# Patient Record
Sex: MALE | Race: BLACK OR AFRICAN AMERICAN | NOT HISPANIC OR LATINO | Employment: FULL TIME | URBAN - METROPOLITAN AREA
[De-identification: names, ages, dates, MRNs, and addresses within clinical notes are randomized per-mention and may not be internally consistent; named-entity substitution may affect disease eponyms.]

---

## 2023-04-25 ENCOUNTER — OFFICE VISIT (OUTPATIENT)
Dept: PRIMARY CARE | Facility: CLINIC | Age: 43
End: 2023-04-25
Payer: COMMERCIAL

## 2023-04-25 ENCOUNTER — LAB (OUTPATIENT)
Dept: LAB | Facility: LAB | Age: 43
End: 2023-04-25
Payer: COMMERCIAL

## 2023-04-25 VITALS
TEMPERATURE: 98.1 F | WEIGHT: 229.7 LBS | DIASTOLIC BLOOD PRESSURE: 110 MMHG | BODY MASS INDEX: 32.04 KG/M2 | SYSTOLIC BLOOD PRESSURE: 163 MMHG | HEART RATE: 73 BPM | OXYGEN SATURATION: 94 %

## 2023-04-25 DIAGNOSIS — G62.9 NEUROPATHY: ICD-10-CM

## 2023-04-25 DIAGNOSIS — E11.69 TYPE 2 DIABETES MELLITUS WITH OTHER SPECIFIED COMPLICATION, WITHOUT LONG-TERM CURRENT USE OF INSULIN (MULTI): ICD-10-CM

## 2023-04-25 DIAGNOSIS — Z00.00 HEALTH CARE MAINTENANCE: ICD-10-CM

## 2023-04-25 DIAGNOSIS — I10 HYPERTENSION, UNSPECIFIED TYPE: Primary | ICD-10-CM

## 2023-04-25 LAB
ALANINE AMINOTRANSFERASE (SGPT) (U/L) IN SER/PLAS: 14 U/L (ref 10–52)
ALBUMIN (G/DL) IN SER/PLAS: 4.7 G/DL (ref 3.4–5)
ALKALINE PHOSPHATASE (U/L) IN SER/PLAS: 48 U/L (ref 33–120)
ANION GAP IN SER/PLAS: 12 MMOL/L (ref 10–20)
ASPARTATE AMINOTRANSFERASE (SGOT) (U/L) IN SER/PLAS: 24 U/L (ref 9–39)
BASOPHILS (10*3/UL) IN BLOOD BY AUTOMATED COUNT: 0.02 X10E9/L (ref 0–0.1)
BASOPHILS/100 LEUKOCYTES IN BLOOD BY AUTOMATED COUNT: 0.3 % (ref 0–2)
BILIRUBIN TOTAL (MG/DL) IN SER/PLAS: 0.7 MG/DL (ref 0–1.2)
CALCIUM (MG/DL) IN SER/PLAS: 9.7 MG/DL (ref 8.6–10.6)
CARBON DIOXIDE, TOTAL (MMOL/L) IN SER/PLAS: 28 MMOL/L (ref 21–32)
CHLORIDE (MMOL/L) IN SER/PLAS: 103 MMOL/L (ref 98–107)
COBALAMIN (VITAMIN B12) (PG/ML) IN SER/PLAS: 739 PG/ML (ref 211–911)
CREATININE (MG/DL) IN SER/PLAS: 0.95 MG/DL (ref 0.5–1.3)
EOSINOPHILS (10*3/UL) IN BLOOD BY AUTOMATED COUNT: 0.11 X10E9/L (ref 0–0.7)
EOSINOPHILS/100 LEUKOCYTES IN BLOOD BY AUTOMATED COUNT: 1.5 % (ref 0–6)
ERYTHROCYTE DISTRIBUTION WIDTH (RATIO) BY AUTOMATED COUNT: 12.8 % (ref 11.5–14.5)
ERYTHROCYTE MEAN CORPUSCULAR HEMOGLOBIN CONCENTRATION (G/DL) BY AUTOMATED: 31.9 G/DL (ref 32–36)
ERYTHROCYTE MEAN CORPUSCULAR VOLUME (FL) BY AUTOMATED COUNT: 85 FL (ref 80–100)
ERYTHROCYTES (10*6/UL) IN BLOOD BY AUTOMATED COUNT: 5.34 X10E12/L (ref 4.5–5.9)
ESTIMATED AVERAGE GLUCOSE FOR HBA1C: 128 MG/DL
GFR MALE: >90 ML/MIN/1.73M2
GLUCOSE (MG/DL) IN SER/PLAS: 104 MG/DL (ref 74–99)
HEMATOCRIT (%) IN BLOOD BY AUTOMATED COUNT: 45.2 % (ref 41–52)
HEMOGLOBIN (G/DL) IN BLOOD: 14.4 G/DL (ref 13.5–17.5)
HEMOGLOBIN A1C/HEMOGLOBIN TOTAL IN BLOOD: 6.1 %
IMMATURE GRANULOCYTES/100 LEUKOCYTES IN BLOOD BY AUTOMATED COUNT: 0.3 % (ref 0–0.9)
LEUKOCYTES (10*3/UL) IN BLOOD BY AUTOMATED COUNT: 7.3 X10E9/L (ref 4.4–11.3)
LYMPHOCYTES (10*3/UL) IN BLOOD BY AUTOMATED COUNT: 2.71 X10E9/L (ref 1.2–4.8)
LYMPHOCYTES/100 LEUKOCYTES IN BLOOD BY AUTOMATED COUNT: 37 % (ref 13–44)
MONOCYTES (10*3/UL) IN BLOOD BY AUTOMATED COUNT: 0.66 X10E9/L (ref 0.1–1)
MONOCYTES/100 LEUKOCYTES IN BLOOD BY AUTOMATED COUNT: 9 % (ref 2–10)
NEUTROPHILS (10*3/UL) IN BLOOD BY AUTOMATED COUNT: 3.81 X10E9/L (ref 1.2–7.7)
NEUTROPHILS/100 LEUKOCYTES IN BLOOD BY AUTOMATED COUNT: 51.9 % (ref 40–80)
NRBC (PER 100 WBCS) BY AUTOMATED COUNT: 0 /100 WBC (ref 0–0)
PLATELETS (10*3/UL) IN BLOOD AUTOMATED COUNT: 231 X10E9/L (ref 150–450)
POTASSIUM (MMOL/L) IN SER/PLAS: 4.2 MMOL/L (ref 3.5–5.3)
PROTEIN TOTAL: 7.5 G/DL (ref 6.4–8.2)
SODIUM (MMOL/L) IN SER/PLAS: 139 MMOL/L (ref 136–145)
UREA NITROGEN (MG/DL) IN SER/PLAS: 13 MG/DL (ref 6–23)

## 2023-04-25 PROCEDURE — 1036F TOBACCO NON-USER: CPT | Performed by: STUDENT IN AN ORGANIZED HEALTH CARE EDUCATION/TRAINING PROGRAM

## 2023-04-25 PROCEDURE — 84153 ASSAY OF PSA TOTAL: CPT

## 2023-04-25 PROCEDURE — 85025 COMPLETE CBC W/AUTO DIFF WBC: CPT

## 2023-04-25 PROCEDURE — 3077F SYST BP >= 140 MM HG: CPT | Performed by: STUDENT IN AN ORGANIZED HEALTH CARE EDUCATION/TRAINING PROGRAM

## 2023-04-25 PROCEDURE — 84154 ASSAY OF PSA FREE: CPT

## 2023-04-25 PROCEDURE — 84207 ASSAY OF VITAMIN B-6: CPT

## 2023-04-25 PROCEDURE — 3080F DIAST BP >= 90 MM HG: CPT | Performed by: STUDENT IN AN ORGANIZED HEALTH CARE EDUCATION/TRAINING PROGRAM

## 2023-04-25 PROCEDURE — 82607 VITAMIN B-12: CPT

## 2023-04-25 PROCEDURE — 3008F BODY MASS INDEX DOCD: CPT | Performed by: STUDENT IN AN ORGANIZED HEALTH CARE EDUCATION/TRAINING PROGRAM

## 2023-04-25 PROCEDURE — 36415 COLL VENOUS BLD VENIPUNCTURE: CPT

## 2023-04-25 PROCEDURE — 80053 COMPREHEN METABOLIC PANEL: CPT

## 2023-04-25 PROCEDURE — 99214 OFFICE O/P EST MOD 30 MIN: CPT | Performed by: STUDENT IN AN ORGANIZED HEALTH CARE EDUCATION/TRAINING PROGRAM

## 2023-04-25 PROCEDURE — 83036 HEMOGLOBIN GLYCOSYLATED A1C: CPT

## 2023-04-25 ASSESSMENT — PAIN SCALES - GENERAL: PAINLEVEL: 0-NO PAIN

## 2023-04-25 NOTE — PROGRESS NOTES
Subjective   Patient ID: Eduin Sparks is a 43 y.o. male who presents for Establish Care.    HPI       #HTN  -Diagnosed in past, noncompliant with medication in the past, pt is not any medications for these, is electing to still refuse medication, wanting to opt for diet change, denies current chest pain, vision changes or headache  Lab Results   Component Value Date    CREATININE 0.90 02/01/2022    BUN 13 02/01/2022     02/01/2022    K 4.2 02/01/2022    CL 99 02/01/2022    CO2 27 02/01/2022       #DM2  #HLD  -Diagnosed in the past, previous documented history of noncompliance with medication  -Again refusing meds, knows the risks of MI or stroke and would like to opt for diet changes  -Ok with repeat blood work    #HM  -Never had a colonoscopy before, interested, also requesting PSA despite not having a FH of prostate cancer or symptoms of unintentional weight loss or night sweats  -Has had Hep C and HIV negative in the past  -TDAP UTD     #Neuropathy  -Right 5th digit, started 2-3 weeks ago, on and off numbness and tingling, has a history of herniated disc in back many years ago, denies injury, does not interfere with work, denies weakness  Lab Results   Component Value Date    HGBA1C 6.6 (A) 02/01/2022      Review of Systems    Denies current chest pain, SOB, headache, vision changes     Objective   BP (!) 163/110   Pulse 73   Temp 36.7 °C (98.1 °F) (Tympanic)   Wt 104 kg (229 lb 11.2 oz)   SpO2 94%   BMI 32.04 kg/m²     Physical Exam    Constitutional: Well developed, awake, alert, oriented x3  Head and Face: NCAT  Eyes: Normal external exam, clear sclera bl  ENT: Normal external inspection of ears and nose. Oropharynx normal.  Cardiovascular: RRR, S1/S2, no murmurs, rubs, or gallops, radial pulses +2, no edema of extremities  Pulmonary: CTAB, no respiratory distress.  Abdomen: +BS, soft, non-tender, nondistended, no guarding or rebound, no masses noted  Neuro: A&O x3, CN II-XII grossly intact, no focal  neuro deficits   MSK: No joint swelling, normal movements of all extremities. Range of motion- normal. Sensation to light touch intact in R hand, full ROM, full strength, Phalen and Tinnell negative, no visible swelling or erythema   Skin- No lesions, contusions, or erythema.  Psychiatric: Judgment intact. Appropriate mood and behavior     Assessment/Plan   Diagnoses and all orders for this visit:  Hypertension, unspecified type  -Significantly elevated, again discussed importance of control with patient, still refusing medication despite significant cardiovascular risk, repeat CMP, pt agreeable to this  Type 2 diabetes mellitus with other specified complication, without long-term current use of insulin (CMS/[facility])  -Reviewed significant risks of not taking statin medication and significant MI/stroke risk, pt still refusing  -     Hemoglobin A1C; Future  -Referred to Podiatry and Opt   Health care maintenance  -     Colonoscopy; Future  -     PSA, total and free; Future  Neuropathy  -Could be related to DM , vs underlying cause, no acute findings on PE, lab work and OT referral  -     Referral to Occupational Therapy; Future  -     Comprehensive Metabolic Panel; Future  -     CBC and Auto Differential; Future  -     Vitamin B12; Future  -     Vitamin B6; Future    Discussed and seen with Dr. Robb  Return in : 1 month for FUV    Portions of this note were generated using digital voice recognition software, and may contain grammatical errors       Nicola Manuel MD  PGY-3, Family Medicine

## 2023-04-25 NOTE — PROGRESS NOTES
I saw and evaluated the patient. I personally obtained the key and critical portions of the history and physical exam or was physically present for key and critical portions performed by the resident/fellow. I reviewed the resident/fellow's documentation and discussed the patient with the resident/fellow. I agree with the resident/fellow's medical decision making as documented in the note with the exception/addition of the following:  Here with DM and HTN and declining to start any meds (don't like pills), counseled regarding lifestyle changes and advised may not be enough.  Interested in age appropriate screenings.  Matt Robb MD

## 2023-04-28 LAB
PROSTATE SPECIFIC AG (NG/ML) IN SER/PLAS: 0.6 NG/ML (ref 0–4)
PROSTATE SPECIFIC AG FREE (NG/ML) IN SER/PLAS: 0.2 NG/ML
PROSTATE SPECIFIC AG FREE/PROSTATE SPECIFIC AG TOTAL IN SER/PLAS: 33 %

## 2023-05-01 LAB — VITAMIN B6: 81.6 NMOL/L (ref 20–125)

## 2023-05-23 ENCOUNTER — OFFICE VISIT (OUTPATIENT)
Dept: PRIMARY CARE | Facility: CLINIC | Age: 43
End: 2023-05-23
Payer: COMMERCIAL

## 2023-05-23 VITALS
DIASTOLIC BLOOD PRESSURE: 92 MMHG | OXYGEN SATURATION: 98 % | HEART RATE: 74 BPM | SYSTOLIC BLOOD PRESSURE: 139 MMHG | HEIGHT: 71 IN | TEMPERATURE: 98 F | WEIGHT: 225.9 LBS | BODY MASS INDEX: 31.63 KG/M2

## 2023-05-23 DIAGNOSIS — I10 HYPERTENSION, UNSPECIFIED TYPE: Primary | ICD-10-CM

## 2023-05-23 DIAGNOSIS — E78.5 HYPERLIPIDEMIA, UNSPECIFIED HYPERLIPIDEMIA TYPE: ICD-10-CM

## 2023-05-23 DIAGNOSIS — E11.69 TYPE 2 DIABETES MELLITUS WITH OTHER SPECIFIED COMPLICATION, WITHOUT LONG-TERM CURRENT USE OF INSULIN (MULTI): ICD-10-CM

## 2023-05-23 DIAGNOSIS — G62.9 NEUROPATHY: ICD-10-CM

## 2023-05-23 PROCEDURE — 1036F TOBACCO NON-USER: CPT | Performed by: STUDENT IN AN ORGANIZED HEALTH CARE EDUCATION/TRAINING PROGRAM

## 2023-05-23 PROCEDURE — 3080F DIAST BP >= 90 MM HG: CPT | Performed by: STUDENT IN AN ORGANIZED HEALTH CARE EDUCATION/TRAINING PROGRAM

## 2023-05-23 PROCEDURE — 3008F BODY MASS INDEX DOCD: CPT | Performed by: STUDENT IN AN ORGANIZED HEALTH CARE EDUCATION/TRAINING PROGRAM

## 2023-05-23 PROCEDURE — 3075F SYST BP GE 130 - 139MM HG: CPT | Performed by: STUDENT IN AN ORGANIZED HEALTH CARE EDUCATION/TRAINING PROGRAM

## 2023-05-23 PROCEDURE — 3044F HG A1C LEVEL LT 7.0%: CPT | Performed by: STUDENT IN AN ORGANIZED HEALTH CARE EDUCATION/TRAINING PROGRAM

## 2023-05-23 PROCEDURE — 99214 OFFICE O/P EST MOD 30 MIN: CPT | Performed by: STUDENT IN AN ORGANIZED HEALTH CARE EDUCATION/TRAINING PROGRAM

## 2023-05-23 RX ORDER — ALBUTEROL SULFATE 90 UG/1
2 AEROSOL, METERED RESPIRATORY (INHALATION) EVERY 4 HOURS PRN
COMMUNITY
Start: 2018-10-06 | End: 2023-06-19 | Stop reason: SDUPTHER

## 2023-05-23 RX ORDER — NEBULIZER AND COMPRESSOR
1 EACH MISCELLANEOUS DAILY
Qty: 1 EACH | Refills: 0 | Status: SHIPPED | OUTPATIENT
Start: 2023-05-23

## 2023-05-23 NOTE — PROGRESS NOTES
I saw and evaluated the patient. I personally obtained the key and critical portions of the history and physical exam or was physically present for key and critical portions performed by the resident/fellow. I reviewed the resident/fellow's documentation and discussed the patient with the resident/fellow. I agree with the resident/fellow's medical decision making as documented in the note.    Matt Robb MD

## 2023-05-23 NOTE — PROGRESS NOTES
"Subjective   Patient ID: Eduin Sparks is a 43 y.o. male who presents for Follow-up.    HPI     #Neuropathy  -See last note for full details, Lab workup largely unremarkable, referred to OT, has been going and this has helped, symptoms have improved   -Has a history of a break in his proximal ulna many years ago in longterm, had gotten a sling, never saw Ortho, OT suspecting some nerve entrapment  -Interested in Ortho referral, fracture was in 2004     #HTN  -See last note for full details, had been non-compliant with all meds, refused at last visit, wanted to manage with diet alone, BP improved today  -Does extensive exercise including Cardio and weight lifting, has reduced salt and fried food intake  -Does not check BP at home, needs cuff     #DM2  -Non-compliant as above, A1c well controlled with diet alone, had refused statin in the past, still refusing today, would like to continue dietary changes   -Dietary changes as above  Lab Results   Component Value Date    HGBA1C 6.1 (A) 04/25/2023          Review of Systems    Pt denies any current chest pain, SOB, nausea, vomiting     Objective   BP (!) 139/92 (BP Location: Left arm, Patient Position: Sitting)   Pulse 74   Temp 36.7 °C (98 °F) (Temporal)   Ht 1.803 m (5' 11\")   Wt 102 kg (225 lb 14.4 oz)   SpO2 98%   BMI 31.51 kg/m²     Physical Exam    Constitutional: Well developed, awake, alert, oriented x3  Head and Face: NCAT  Eyes: Normal external exam, clear sclera bl  ENT: Normal external inspection of ears and nose. Oropharynx normal.  Cardiovascular: RRR, S1/S2, no murmurs, rubs, or gallops, radial pulses +2, no edema of extremities  Pulmonary: CTAB, no respiratory distress.  Abdomen: +BS, soft, non-tender, nondistended, no guarding or rebound, no masses noted  Neuro: A&O x3, CN II-XII grossly intact, no focal neuro deficits   MSK: No joint swelling, normal movements of all extremities. Range of motion- normal. Appropriate  strength in RUE, has some " limited ROM on R elbow extension, sensation to light touch intact, negative Phalen or Tinnels sign  Skin- No lesions, contusions, or erythema.  Psychiatric: Judgment intact. Appropriate mood and behavior     Assessment/Plan   Diagnoses and all orders for this visit:  Hypertension, unspecified type  -     miscellaneous medical supply (Blood Pressure Cuff) misc; 1 kit once daily.  -Pt still opting for diet control only, reviewed 4 pillars of Fitter Me with patient and Dash diet, recommended again low diet in fatty and fried foods and weight loss  Type 2 diabetes mellitus with other specified complication, without long-term current use of insulin (CMS/[facility])  -Pt again opting for dietary changes only, as above  Neuropathy  -     Referral to Orthopaedic Surgery; Future  -Appears improved ,continue OT, refer to Ortho for potential surgical intervention  Hyperlipidemia, unspecified hyperlipidemia type  - Refusing statin again, dietary changes as above    Discussed and seen with Dr. oRbb  Return in : 1-2 months for  visit        Portions of this note were generated using digital voice recognition software, and may contain grammatical errors       Nicola Manuel MD  PGY-3, Family Medicine

## 2023-06-16 ENCOUNTER — APPOINTMENT (OUTPATIENT)
Dept: PRIMARY CARE | Facility: CLINIC | Age: 43
End: 2023-06-16
Payer: COMMERCIAL

## 2023-06-19 ENCOUNTER — TELEMEDICINE (OUTPATIENT)
Dept: PRIMARY CARE | Facility: CLINIC | Age: 43
End: 2023-06-19
Payer: COMMERCIAL

## 2023-06-19 VITALS — BODY MASS INDEX: 31.51 KG/M2 | HEIGHT: 71 IN

## 2023-06-19 DIAGNOSIS — Z00.00 HEALTH CARE MAINTENANCE: ICD-10-CM

## 2023-06-19 DIAGNOSIS — I10 PRIMARY HYPERTENSION: Primary | ICD-10-CM

## 2023-06-19 PROBLEM — M19.90 ARTHRITIS: Status: ACTIVE | Noted: 2023-06-19

## 2023-06-19 PROBLEM — E11.9 TYPE 2 DIABETES MELLITUS WITHOUT COMPLICATION, WITHOUT LONG-TERM CURRENT USE OF INSULIN (MULTI): Status: ACTIVE | Noted: 2023-06-19

## 2023-06-19 PROBLEM — S52.121A CLOSED DISPLACED FRACTURE OF HEAD OF RIGHT RADIUS: Status: ACTIVE | Noted: 2021-07-26

## 2023-06-19 PROBLEM — F17.200 NICOTINE DEPENDENCE: Status: ACTIVE | Noted: 2023-06-19

## 2023-06-19 PROBLEM — R73.09 ELEVATED HEMOGLOBIN A1C: Status: ACTIVE | Noted: 2023-06-19

## 2023-06-19 PROCEDURE — 99212 OFFICE O/P EST SF 10 MIN: CPT | Performed by: FAMILY MEDICINE

## 2023-06-19 RX ORDER — ATORVASTATIN CALCIUM 40 MG/1
1 TABLET, FILM COATED ORAL DAILY
COMMUNITY
Start: 2022-04-08 | End: 2024-05-29

## 2023-06-19 RX ORDER — NEBULIZER AND COMPRESSOR
1 EACH MISCELLANEOUS DAILY
Qty: 1 EACH | Refills: 0 | Status: SHIPPED | OUTPATIENT
Start: 2023-06-19

## 2023-06-19 RX ORDER — CHLORTHALIDONE 25 MG/1
1 TABLET ORAL DAILY
COMMUNITY
Start: 2018-09-10 | End: 2024-05-29 | Stop reason: SDUPTHER

## 2023-06-19 RX ORDER — ALBUTEROL SULFATE 90 UG/1
2 AEROSOL, METERED RESPIRATORY (INHALATION) EVERY 4 HOURS PRN
Qty: 18 G | Refills: 2 | Status: SHIPPED | OUTPATIENT
Start: 2023-06-19

## 2023-06-19 RX ORDER — ASPIRIN 81 MG/1
81 TABLET ORAL 2 TIMES DAILY
COMMUNITY
Start: 2021-04-15 | End: 2024-05-29 | Stop reason: SDDI

## 2023-06-19 RX ORDER — METFORMIN HYDROCHLORIDE 500 MG/1
TABLET, EXTENDED RELEASE ORAL EVERY 12 HOURS
COMMUNITY
Start: 2022-04-08 | End: 2024-05-29

## 2023-06-19 RX ORDER — DEXTROMETHORPHAN POLISTIREX 30 MG/5ML
60 SUSPENSION ORAL 2 TIMES DAILY
COMMUNITY
Start: 2018-10-06

## 2023-06-19 NOTE — PROGRESS NOTES
HPI:  History was provided by patient,since last seen for Hypertension,patient denies chest pain,Palpitation,Headache and blurry vision.  But complains of - occasional SOB for which he has been using an inhaler Prn. Medications reviewed with patient and patient reports compliance with meds.  Has been working on lifestyle modifications to control DM and HTN , declines to use medications.      Objective   PHYSICAL EXAMINATION:  GENERAL: Alert,In no apparent distress  HEENT: Normocephalic, atraumatic. Extraocular movements intact.  NECK: Supple.  CHEST: Non labored breathing  EXTREMITIES: NROM  NEUROLOGIC: Motor Functions Grossly intact      Lab Review   Lab Results   Component Value Date     04/25/2023    K 4.2 04/25/2023     04/25/2023    CO2 28 04/25/2023    BUN 13 04/25/2023    CREATININE 0.95 04/25/2023    GLUCOSE 104 (H) 04/25/2023    CALCIUM 9.7 04/25/2023     Lab Results   Component Value Date    CHOL 246 (H) 02/01/2022    TRIG 120 02/01/2022    HDL 44.9 02/01/2022       Assessment/Plan   Diagnoses and all orders for this visit:  Primary hypertension  -     miscellaneous medical supply (Blood Pressure Cuff) misc; 1 Units once daily. Cuff to check BP daily  Health care maintenance  -     albuterol 90 mcg/actuation inhaler; Inhale 2 puffs every 4 hours if needed for shortness of breath.    Diagnosis and Management discussed with the patient.  Patient agreeable with plan.  Patient advised to Return to clinic with new or unresolved symptoms.  Matt Robb MD

## 2024-05-29 ENCOUNTER — OFFICE VISIT (OUTPATIENT)
Dept: PRIMARY CARE | Facility: CLINIC | Age: 44
End: 2024-05-29
Payer: COMMERCIAL

## 2024-05-29 ENCOUNTER — LAB (OUTPATIENT)
Dept: LAB | Facility: LAB | Age: 44
End: 2024-05-29
Payer: COMMERCIAL

## 2024-05-29 VITALS
DIASTOLIC BLOOD PRESSURE: 93 MMHG | BODY MASS INDEX: 31.08 KG/M2 | SYSTOLIC BLOOD PRESSURE: 158 MMHG | TEMPERATURE: 97.6 F | WEIGHT: 222 LBS | HEART RATE: 70 BPM | HEIGHT: 71 IN | OXYGEN SATURATION: 99 %

## 2024-05-29 DIAGNOSIS — E11.9 TYPE 2 DIABETES MELLITUS WITHOUT COMPLICATION, WITHOUT LONG-TERM CURRENT USE OF INSULIN (MULTI): ICD-10-CM

## 2024-05-29 DIAGNOSIS — I10 PRIMARY HYPERTENSION: ICD-10-CM

## 2024-05-29 DIAGNOSIS — I10 PRIMARY HYPERTENSION: Primary | ICD-10-CM

## 2024-05-29 LAB
ALBUMIN SERPL BCP-MCNC: 4.7 G/DL (ref 3.4–5)
ALP SERPL-CCNC: 46 U/L (ref 33–120)
ALT SERPL W P-5'-P-CCNC: 12 U/L (ref 10–52)
ANION GAP SERPL CALC-SCNC: 12 MMOL/L (ref 10–20)
AST SERPL W P-5'-P-CCNC: 19 U/L (ref 9–39)
BILIRUB SERPL-MCNC: 0.7 MG/DL (ref 0–1.2)
BUN SERPL-MCNC: 12 MG/DL (ref 6–23)
CALCIUM SERPL-MCNC: 9.6 MG/DL (ref 8.6–10.6)
CHLORIDE SERPL-SCNC: 100 MMOL/L (ref 98–107)
CHOLEST SERPL-MCNC: 230 MG/DL (ref 0–199)
CHOLESTEROL/HDL RATIO: 5.3
CO2 SERPL-SCNC: 29 MMOL/L (ref 21–32)
CREAT SERPL-MCNC: 0.98 MG/DL (ref 0.5–1.3)
CREAT UR-MCNC: 240 MG/DL (ref 20–370)
EGFRCR SERPLBLD CKD-EPI 2021: >90 ML/MIN/1.73M*2
EST. AVERAGE GLUCOSE BLD GHB EST-MCNC: 131 MG/DL
GLUCOSE SERPL-MCNC: 91 MG/DL (ref 74–99)
HBA1C MFR BLD: 6.2 %
HDLC SERPL-MCNC: 43.6 MG/DL
MICROALBUMIN UR-MCNC: 28.6 MG/L
MICROALBUMIN/CREAT UR: 11.9 UG/MG CREAT
NON-HDL CHOLESTEROL: 186 MG/DL (ref 0–149)
POTASSIUM SERPL-SCNC: 4.3 MMOL/L (ref 3.5–5.3)
PROT SERPL-MCNC: 7.8 G/DL (ref 6.4–8.2)
SODIUM SERPL-SCNC: 137 MMOL/L (ref 136–145)

## 2024-05-29 PROCEDURE — 82043 UR ALBUMIN QUANTITATIVE: CPT

## 2024-05-29 PROCEDURE — 80053 COMPREHEN METABOLIC PANEL: CPT

## 2024-05-29 PROCEDURE — 82465 ASSAY BLD/SERUM CHOLESTEROL: CPT

## 2024-05-29 PROCEDURE — 83718 ASSAY OF LIPOPROTEIN: CPT

## 2024-05-29 PROCEDURE — 83036 HEMOGLOBIN GLYCOSYLATED A1C: CPT

## 2024-05-29 PROCEDURE — 82570 ASSAY OF URINE CREATININE: CPT

## 2024-05-29 RX ORDER — CHLORTHALIDONE 25 MG/1
25 TABLET ORAL DAILY
Qty: 90 TABLET | Refills: 3 | Status: SHIPPED | OUTPATIENT
Start: 2024-05-29

## 2024-05-29 ASSESSMENT — PAIN SCALES - GENERAL: PAINLEVEL: 0-NO PAIN

## 2024-05-29 NOTE — PROGRESS NOTES
Agree with Resident and/or Medical student plan to start Thiazide diuretic for HTN disorder likely 2/2 to med noncompliance and encourage patient remain compliant with medication    Patient discussed with attending, Dr. Fabiola Messina MD  Family Medicine, PGY-3

## 2024-05-31 NOTE — PROGRESS NOTES
"Subjective   Patient ID: Eduin Sparks is a 44 y.o. male with a PMH of HTN, D2M, HLD, and neuropathy who presents for Annual Exam.    HPI     Patient states he has been having stabbing temporal headaches almost everyday for the past 2 months. He states they usually last about two minutes and are an 8/10 on the pain scale. He doesn't notice anything thing in particular that triggers them and states they do not have a pattern regarding time of day. He states nothing particularly worsens it and has not tried anything to make it better.     He also acknowledges that his blood pressure remains elevated despite implementing a diet and exercise routine. He states he cut out salt, eats a healthy varied diet, and exercises at least three times a week. He has declined medication at previous visits claiming he does not like to take pills. He states his 25 yo son passed away after taking \"a bad pill\" and now does not take any medications except for OTC allergy medication PRN. He also states he was hesitant to take medication due to possible side effects, including erectile dysfunction.    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 He has no other acute complaints at this time.     Review of Systems  Negative except mentioned above in HPI    Objective   BP (!) 158/93   Pulse 70   Temp 36.4 °C (97.6 °F) (Temporal)   Ht 1.803 m (5' 11\")   Wt 101 kg (222 lb)   SpO2 99%   BMI 30.96 kg/m²     Physical Exam  Constitutional:       Appearance: Normal appearance.   HENT:      Head: Normocephalic and atraumatic.   Cardiovascular:      Rate and Rhythm: Normal rate and regular rhythm.   Pulmonary:      Effort: Pulmonary effort is normal.      Breath sounds: Normal breath sounds.   Skin:     General: Skin is warm and dry.   Neurological:      General: No focal deficit present.   Psychiatric:         Mood and Affect: Mood normal.       "   Behavior: Behavior normal.       Assessment/Plan   Diagnoses and all orders for this visit:  Primary hypertension  -     Albumin, urine, random; Future  -     Lipid Panel Non-Fasting; Future  -     Comprehensive metabolic panel; Future  -     chlorthalidone (Hygroton) 25 mg tablet; Take 1 tablet (25 mg) by mouth once daily.  Type 2 diabetes mellitus without complication, without long-term current use of insulin (Multi)  -     Hemoglobin A1c; Future  Other orders  -     Pneumococcal conjugate vaccine, 20-valent (PREVNAR 20)  -     Hepatitis B vaccine, 20 yrs and older (RECOMBIVAX, ENGERIX)    Eduin Sparks is a 44 y.o. male with a PMH of HTN, D2M, HLD, and neuropathy who presents for Annual Exam. The importance of managing blood pressure was discussed extensively with the patient. Patient is motivated to improve his blood pressure and maintain his overall health. Patient was counseled on the risk/benefits of continuing non-pharmacologic BP lowering regimen vs. implementing chlorthialidone. Patient was also counseled that lowering his BP may improve his headaches. Patient agreed to start chlorthalidone 25mg and continue his exercise/diet regimen. Detailed return precautions regarding headaches provided.     Patient was also given Prevnar-20 and first dose of HepB vaccine in office today.     RTC in 4-6 weeks for BP fu or earlier as needed.    I saw and evaluated the patient. I personally obtained the key and critical portions of the history and physical exam or was physically present for key and critical portions performed by the student. I reviewed the student's documentation and discussed the patient with the student. I agree with the studen'ts medical decision making as documented in the note.    43 yo M with HTN and failed non-medication management presenting for annual visit. Pt complaining of intermittent headaches, believed to be secondary to uncontrolled high blood pressure. Pt denied red flag symptoms related  to his headaches. Pt amenable to beginning medication today. FU in 4-6 weeks for BP follow up or earlier as needed.     Duncan Alex, DO  PGY-1 Family Medicine

## 2024-06-12 NOTE — PROGRESS NOTES
I saw and evaluated the patient. I personally obtained the key and critical portions of the history and physical exam or was physically present for key and critical portions performed by the resident/fellow. I reviewed the resident/fellow's documentation and discussed the patient with the resident/fellow. I agree with the resident/fellow's medical decision making as documented in the note.    Elli Yip MD

## 2025-03-04 ENCOUNTER — OFFICE VISIT (OUTPATIENT)
Facility: HOSPITAL | Age: 45
End: 2025-03-04
Payer: COMMERCIAL

## 2025-03-04 VITALS
OXYGEN SATURATION: 97 % | BODY MASS INDEX: 31.3 KG/M2 | HEART RATE: 96 BPM | TEMPERATURE: 98.2 F | RESPIRATION RATE: 16 BRPM | SYSTOLIC BLOOD PRESSURE: 154 MMHG | WEIGHT: 223.6 LBS | HEIGHT: 71 IN | DIASTOLIC BLOOD PRESSURE: 82 MMHG

## 2025-03-04 DIAGNOSIS — Z12.11 ENCOUNTER FOR SCREENING FOR MALIGNANT NEOPLASM OF COLON: ICD-10-CM

## 2025-03-04 DIAGNOSIS — E11.9 TYPE 2 DIABETES MELLITUS WITHOUT COMPLICATION, WITHOUT LONG-TERM CURRENT USE OF INSULIN (MULTI): ICD-10-CM

## 2025-03-04 DIAGNOSIS — I10 PRIMARY HYPERTENSION: ICD-10-CM

## 2025-03-04 DIAGNOSIS — G89.29 CHRONIC BILATERAL LOW BACK PAIN WITHOUT SCIATICA: ICD-10-CM

## 2025-03-04 DIAGNOSIS — Z00.00 ANNUAL PHYSICAL EXAM: Primary | ICD-10-CM

## 2025-03-04 DIAGNOSIS — M54.50 CHRONIC BILATERAL LOW BACK PAIN WITHOUT SCIATICA: ICD-10-CM

## 2025-03-04 DIAGNOSIS — Z00.00 HEALTH CARE MAINTENANCE: ICD-10-CM

## 2025-03-04 DIAGNOSIS — Z11.3 SCREEN FOR STD (SEXUALLY TRANSMITTED DISEASE): ICD-10-CM

## 2025-03-04 DIAGNOSIS — E78.2 MIXED HYPERLIPIDEMIA: ICD-10-CM

## 2025-03-04 DIAGNOSIS — R06.02 SOB (SHORTNESS OF BREATH): ICD-10-CM

## 2025-03-04 RX ORDER — ATORVASTATIN CALCIUM 20 MG/1
20 TABLET, FILM COATED ORAL DAILY
Qty: 100 TABLET | Refills: 3 | Status: SHIPPED | OUTPATIENT
Start: 2025-03-04 | End: 2026-04-08

## 2025-03-04 RX ORDER — ALBUTEROL SULFATE 90 UG/1
2 INHALANT RESPIRATORY (INHALATION) EVERY 4 HOURS PRN
Qty: 18 G | Refills: 2 | Status: SHIPPED | OUTPATIENT
Start: 2025-03-04

## 2025-03-04 RX ORDER — CHLORTHALIDONE 25 MG/1
25 TABLET ORAL DAILY
Qty: 90 TABLET | Refills: 3 | Status: SHIPPED | OUTPATIENT
Start: 2025-03-04

## 2025-03-04 ASSESSMENT — ENCOUNTER SYMPTOMS
LOSS OF SENSATION IN FEET: 0
DEPRESSION: 0
OCCASIONAL FEELINGS OF UNSTEADINESS: 0

## 2025-03-04 ASSESSMENT — PAIN SCALES - GENERAL: PAINLEVEL_OUTOF10: 6

## 2025-03-04 NOTE — PROGRESS NOTES
"Subjective   Patient ID: Eduin Sparks is a 45 y.o. male who presents for Physical.    HPI   Patient present for annual physical exam     Lower back pain   - chronic issues  - recent excarbation secondary to lifting heavy barrel at work   - denies aany bowel or urinary incontinence   - has been taking occasional ibuprofen     HTN  - patient endorse compliance with chlorthalidone  - patient reports that he does check his blood pressure at home but does not remember what they were    - denies CP, SOB, headache, vision changes, palpitations, leg swelling     SOB  - has not been experiencing any lately  - requesting refill of albuterol inhaler   - reports that he does not smoke tobacco products but does smoke MJ daily     T2DM  - endorse compliance with dietary changes   - has not followed up with an eye doctor in awhile   - checks feet often and denies any concerns for wounds or lesions     PHQ2: negative     Declines any vaccines     Patient interested in colonoscopy at main campus     Review of Systems  ROS negative unless noted in HPI  Objective   BP (!) 146/96 (BP Location: Right arm, Patient Position: Sitting, BP Cuff Size: Adult)   Pulse 96   Temp 36.8 °C (98.2 °F) (Temporal)   Resp 16   Ht 1.803 m (5' 11\")   Wt 101 kg (223 lb 9.6 oz)   SpO2 97%   BMI 31.19 kg/m²     Physical Exam  Vitals reviewed.   Constitutional:       General: He is not in acute distress.     Appearance: Normal appearance. He is obese.   HENT:      Right Ear: External ear normal.      Left Ear: External ear normal.      Nose: Nose normal.   Cardiovascular:      Rate and Rhythm: Normal rate and regular rhythm.      Pulses: Normal pulses.      Heart sounds: Normal heart sounds. No murmur heard.     No friction rub. No gallop.   Pulmonary:      Effort: Pulmonary effort is normal. No respiratory distress.      Breath sounds: Normal breath sounds.   Abdominal:      General: Bowel sounds are normal. There is no distension.      Palpations: " Abdomen is soft.      Tenderness: There is no abdominal tenderness. There is no guarding.   Musculoskeletal:         General: Normal range of motion.      Cervical back: Normal, normal range of motion and neck supple.      Thoracic back: Normal.      Lumbar back: Tenderness present. No bony tenderness. Normal range of motion.      Right lower leg: No edema.      Left lower leg: No edema.      Comments: + tenderness on palpitation of paraspinal muscles    Negative straight leg test bilaterally    Skin:     General: Skin is warm and dry.      Capillary Refill: Capillary refill takes less than 2 seconds.   Neurological:      General: No focal deficit present.      Mental Status: He is alert and oriented to person, place, and time.      Cranial Nerves: No cranial nerve deficit.      Sensory: No sensory deficit.      Motor: No weakness.   Psychiatric:         Mood and Affect: Mood normal.         Behavior: Behavior normal.         Assessment/Plan   Eduin Sparks is 46 y/o M w/ HTN, T2DM (A1C 6.2% 5/29/24), HLD, and obesity (BMI: 31.19) who presented for annual physical exam.  Plan below:   \  Diagnoses and all orders for this visit:  Primary hypertension  - IO BP: 146/96 on manual repeat 154/82, not at goal of <130/80  - patient asx   - discussed recommendation of adding additional antihypertensive agent, which patient was not amenable  - Provided information of DASH diet and recommend trial for life style modifications   - c/w current regiment of chlorthalidone 25mg every day   - encouraged patient to monitor ambulatory blood pressure with blood pressure log provided   - patient would benefit from having own blood pressure monitor at home for ambulatory blood pressure monitoring   -     chlorthalidone (Hygroton) 25 mg tablet; Take 1 tablet (25 mg) by mouth once daily.  -     Comprehensive Metabolic Panel; Future  -     Albumin-Creatinine Ratio, Urine Random; Future  -     miscellaneous medical supply (Blood Pressure Cuff)  misc; 1 Units once daily. Cuff to check BP daily  Health care maintenance  -     Vitamin D 25-Hydroxy,Total (for eval of Vitamin D levels); Future  SOB (shortness of breath)  - recommend cessation of daily smoking of MJ for symptoms of SOB and HTN  -     albuterol 90 mcg/actuation inhaler; Inhale 2 puffs every 4 hours if needed for shortness of breath.  Mixed hyperlipidemia  - shared decision making discussion with patient about starting statin therapy for elevated ASCVD risk and history of uncontrolled HTN and T2DM  - patient amenable to trial of atorvastatin 20mg every day, with plan of up-titrating at follow up appointment to high intensity therapy   -     atorvastatin (Lipitor) 20 mg tablet; Take 1 tablet (20 mg) by mouth once daily.  Type 2 diabetes mellitus without complication, without long-term current use of insulin (Multi)  - Recommend continued life style modifications for management   - Plan on discussing initiation of antidiabetic medication pending repeat A1C at follow up appointment   -     Albumin-Creatinine Ratio, Urine Random; Future  -     Hemoglobin A1c; Future  -     Referral to Ophthalmology; Future  Screen for STD (sexually transmitted disease)  -     C. trachomatis / N. gonorrhoeae, Amplified, Urogenital; Future  -     HIV 1/2 Antigen/Antibody Screen with Reflex to Confirmation; Future  -     Syphilis Screen with Reflex; Future  -     Trichomonas vaginalis, Amplified; Future  Encounter for screening for malignant neoplasm of colon  -     Colonoscopy Screening; Average Risk Patient; Future  Chronic bilateral low back pain without sciatica  - chronic issue, with recent exacerbation secondary to lifting heavy object at work   - negative straight leg test bilaterally   - recommend RICE and regular stretching, provide handout on stretches for chronic back pain   Other orders  -     Follow Up In Primary Care - Established; Future       # Routine Health Maintenance  - Flu vaccine: recommended  annually, patient declined   - Prevnar 20: recommended but declined   - Tdap: due 2/1/2032 or sooner if indicated   - HPV (<45): not indicated   - Shingrix(50+): not indicated  - Hep C: negative 2/1/2022  - HIV: negative 2/13/2021, ordered repeat  - Syphilis: ordered repeat   - Lipid Panel (35M,45F): positive on 5/29/24, initiation of statin therapy on 3/4/2025  - DM screening: T2DM diagnosed by elevated A1C of 6.6% 2/1/2022 with few measurements of hyperglycemia, not currently on medications and manage with life style  - HTN screening: on treatment  - Vitamin D 25-OH: 3/4/2025 ordered   - Depression: PHQ-2: negative on 3/4/2025  - Tobacco Cessation: not indicated   - Last Dental: recommended follow up  - Last Eye exam: recommended follow up  - Colonoscopy (50-75): colonoscopy ordered on 3/4/2025  - AAA screening (65-75M): not indicated  - Lung CA screening (55-80): not indicated     **RTC in 4-6 weeks for follow up on multiple chronic medical conditions or sooner if needed     Patient was discussed with Dr. Yip

## 2025-03-06 PROBLEM — E78.2 MIXED HYPERLIPIDEMIA: Status: ACTIVE | Noted: 2025-03-06

## 2025-03-06 PROBLEM — R73.09 ELEVATED HEMOGLOBIN A1C: Status: RESOLVED | Noted: 2023-06-19 | Resolved: 2025-03-06

## 2025-03-06 PROBLEM — M54.50 CHRONIC BILATERAL LOW BACK PAIN WITHOUT SCIATICA: Status: ACTIVE | Noted: 2025-03-06

## 2025-03-06 PROBLEM — G89.29 CHRONIC BILATERAL LOW BACK PAIN WITHOUT SCIATICA: Status: ACTIVE | Noted: 2025-03-06

## 2025-03-06 LAB
25(OH)D3+25(OH)D2 SERPL-MCNC: 11 NG/ML (ref 30–100)
ALBUMIN SERPL-MCNC: 4.8 G/DL (ref 3.6–5.1)
ALBUMIN/CREAT UR: 5 MG/G CREAT
ALP SERPL-CCNC: 38 U/L (ref 36–130)
ALT SERPL-CCNC: 11 U/L (ref 9–46)
ANION GAP SERPL CALCULATED.4IONS-SCNC: 10 MMOL/L (CALC) (ref 7–17)
AST SERPL-CCNC: 18 U/L (ref 10–40)
BILIRUB SERPL-MCNC: 0.5 MG/DL (ref 0.2–1.2)
BUN SERPL-MCNC: 15 MG/DL (ref 7–25)
C TRACH RRNA SPEC QL NAA+PROBE: NOT DETECTED
CALCIUM SERPL-MCNC: 9.7 MG/DL (ref 8.6–10.3)
CHLORIDE SERPL-SCNC: 99 MMOL/L (ref 98–110)
CO2 SERPL-SCNC: 29 MMOL/L (ref 20–32)
CREAT SERPL-MCNC: 0.87 MG/DL (ref 0.6–1.29)
CREAT UR-MCNC: 185 MG/DL (ref 20–320)
EGFRCR SERPLBLD CKD-EPI 2021: 107 ML/MIN/1.73M2
EST. AVERAGE GLUCOSE BLD GHB EST-MCNC: 131 MG/DL
EST. AVERAGE GLUCOSE BLD GHB EST-SCNC: 7.3 MMOL/L
GLUCOSE SERPL-MCNC: 125 MG/DL (ref 65–99)
HBA1C MFR BLD: 6.2 % OF TOTAL HGB
HIV 1+2 AB+HIV1 P24 AG SERPL QL IA: NORMAL
MICROALBUMIN UR-MCNC: 1 MG/DL
N GONORRHOEA RRNA SPEC QL NAA+PROBE: NOT DETECTED
POTASSIUM SERPL-SCNC: 3.5 MMOL/L (ref 3.5–5.3)
PROT SERPL-MCNC: 7.6 G/DL (ref 6.1–8.1)
QUEST GC CT AMPLIFIED (ALWAYS MESSAGE): NORMAL
SODIUM SERPL-SCNC: 138 MMOL/L (ref 135–146)
T PALLIDUM AB SER QL IA: NEGATIVE
T VAGINALIS RRNA SPEC QL NAA+PROBE: NOT DETECTED

## 2025-03-06 RX ORDER — NEBULIZER AND COMPRESSOR
1 EACH MISCELLANEOUS DAILY
Qty: 1 EACH | Refills: 0 | Status: SHIPPED | OUTPATIENT
Start: 2025-03-06

## 2025-03-07 ENCOUNTER — TELEPHONE (OUTPATIENT)
Facility: HOSPITAL | Age: 45
End: 2025-03-07
Payer: COMMERCIAL

## 2025-03-07 DIAGNOSIS — E55.9 VITAMIN D DEFICIENCY: Primary | ICD-10-CM

## 2025-03-07 RX ORDER — ACETAMINOPHEN 500 MG
2000 TABLET ORAL DAILY
Qty: 90 CAPSULE | Refills: 3 | Status: SHIPPED | OUTPATIENT
Start: 2025-03-07 | End: 2026-03-07

## 2025-03-07 NOTE — TELEPHONE ENCOUNTER
Result Communication    Resulted Orders   Comprehensive Metabolic Panel   Result Value Ref Range    GLUCOSE 125 (H) 65 - 99 mg/dL      Comment:                    Fasting reference interval     For someone without known diabetes, a glucose value  between 100 and 125 mg/dL is consistent with  prediabetes and should be confirmed with a  follow-up test.         UREA NITROGEN (BUN) 15 7 - 25 mg/dL    CREATININE 0.87 0.60 - 1.29 mg/dL    EGFR 107 > OR = 60 mL/min/1.73m2    SODIUM 138 135 - 146 mmol/L    POTASSIUM 3.5 3.5 - 5.3 mmol/L    CHLORIDE 99 98 - 110 mmol/L    CARBON DIOXIDE 29 20 - 32 mmol/L    ELECTROLYTE BALANCE 10 7 - 17 mmol/L (calc)    CALCIUM 9.7 8.6 - 10.3 mg/dL    PROTEIN, TOTAL 7.6 6.1 - 8.1 g/dL    ALBUMIN 4.8 3.6 - 5.1 g/dL    BILIRUBIN, TOTAL 0.5 0.2 - 1.2 mg/dL    ALKALINE PHOSPHATASE 38 36 - 130 U/L    AST 18 10 - 40 U/L    ALT 11 9 - 46 U/L   HIV 1/2 Antigen/Antibody Screen with Reflex to Confirmation   Result Value Ref Range    HIV AG/AB, 4TH GEN NON-REACTIVE NON-REACTIVE      Comment:      HIV-1 antigen and HIV-1/HIV-2 antibodies were not  detected. There is no laboratory evidence of HIV  infection.     PLEASE NOTE: This information has been disclosed to  you from records whose confidentiality may be  protected by state law.  If your state requires such  protection, then the state law prohibits you from  making any further disclosure of the information  without the specific written consent of the person  to whom it pertains, or as otherwise permitted by law.  A general authorization for the release of medical or  other information is NOT sufficient for this purpose.      For additional information please refer to  http://education.[employer].com/faq/PXD967  (This link is being provided for informational/  educational purposes only.)        The performance of this assay has not been clinically  validated in patients less than 2 years old.        Syphilis Screen with Reflex   Result Value Ref  Range    T. PALLIDUM AB Negative Negative      Comment:         No antibodies to T. pallidum (the agent causing  syphilis) were detected in the specimen. This result,  however, does not exclude very recent T. pallidum  infection; testing of a second specimen, collected 2-4  weeks after this specimen, is recommended if the index  of suspicion for recent infection is high.        Hemoglobin A1c   Result Value Ref Range    HEMOGLOBIN A1c 6.2 (H) <5.7 % of total Hgb      Comment:      For someone without known diabetes, a hemoglobin   A1c value between 5.7% and 6.4% is consistent with  prediabetes and should be confirmed with a   follow-up test.     For someone with known diabetes, a value <7%  indicates that their diabetes is well controlled. A1c  targets should be individualized based on duration of  diabetes, age, comorbid conditions, and other  considerations.     This assay result is consistent with an increased risk  of diabetes.     Currently, no consensus exists regarding use of  hemoglobin A1c for diagnosis of diabetes for children.         eAG (mg/dL) 131 mg/dL    eAG (mmol/L) 7.3 mmol/L   Vitamin D 25-Hydroxy,Total (for eval of Vitamin D levels)   Result Value Ref Range    VITAMIN D,25-OH,TOTAL,IA 11 (L) 30 - 100 ng/mL      Comment:      Vitamin D Status         25-OH Vitamin D:     Deficiency:                    <20 ng/mL  Insufficiency:             20 - 29 ng/mL  Optimal:                 > or = 30 ng/mL     For 25-OH Vitamin D testing on patients on   D2-supplementation and patients for whom quantitation   of D2 and D3 fractions is required, the [employer]()  25-OH VIT D, (D2,D3), LC/MS/MS is recommended: order   code 25941 (patients >2yrs).     See Note 1     Note 1     For additional information, please refer to   http://education.[employer]/faq/EMH074   (This link is being provided for informational/  educational purposes only.)     C. trachomatis / N. gonorrhoeae, Amplified, Urogenital    Result Value Ref Range    CHLAMYDIA TRACHOMATIS RNA, TMA, UROGENITAL NOT DETECTED NOT DETECTED    NEISSERIA GONORRHOEAE RNA, TMA, UROGENITAL NOT DETECTED NOT DETECTED    (Always Message)        Comment:      The analytical performance characteristics of this  assay, when used to test SurePath(TM) specimens have been  determined by [employer]. The modifications have  not been cleared or approved by the FDA. This assay has  been validated pursuant to the CLIA regulations and is  used for clinical purposes.     For additional information, please refer to  https://[employer]/faq/NDJ106  (This link is being provided for information/  educational purposes only.)        Trichomonas vaginalis, Amplified   Result Value Ref Range    TRICHOMONAS VAGINALIS RNA, QL TMA NOT DETECTED NOT DETECTED      Comment:      For additional information, please refer to  [email or web address]  [email or web address]  (This link is being provided for informational/  educational purposes only.)        Albumin-Creatinine Ratio, Urine Random   Result Value Ref Range    CREATININE, RANDOM URINE 185 20 - 320 mg/dL    ALBUMIN, URINE 1.0 See Note: mg/dL      Comment:      Reference Range:    Reference Range  Not established      ALBUMIN/CREATININE RATIO, RANDOM URINE 5 <30 mg/g creat      Comment:         The ADA defines abnormalities in albumin  excretion as follows:     Albuminuria Category        Result (mg/g creatinine)     Normal to Mildly increased   <30  Moderately increased            Severely increased           > OR = 300     The ADA recommends that at least two of three  specimens collected within a 3-6 month period be  abnormal before considering a patient to be  within a diagnostic category.         4:32 PM      Results were successfully communicated with the patient and they acknowledged their understanding.  Patient was identified by full name and .  Discussed results with patient  including appropriate renal & liver functions, negative screening for STD/STI, stable hemoglobin A1C, and slight elevated blood glucose levels with non-fasting blood work.  Additionally discussed the low vitamin D levels, and recommended therapy with vitamin D supplementation.  Patient amenable and requesting to have daily vitamin D supplementation.  Will follow up with patient at next clinic visit.     MONA Aguilar MD MS  PGY-3 Family Medicine

## 2025-03-21 DIAGNOSIS — Z12.11 COLON CANCER SCREENING: Primary | ICD-10-CM

## 2025-03-21 RX ORDER — POLYETHYLENE GLYCOL 3350, SODIUM CHLORIDE, SODIUM BICARBONATE, POTASSIUM CHLORIDE 420; 11.2; 5.72; 1.48 G/4L; G/4L; G/4L; G/4L
4000 POWDER, FOR SOLUTION ORAL ONCE
Qty: 4000 ML | Refills: 0 | Status: SHIPPED | OUTPATIENT
Start: 2025-03-21 | End: 2025-03-21